# Patient Record
Sex: FEMALE | Race: WHITE | NOT HISPANIC OR LATINO | URBAN - METROPOLITAN AREA
[De-identification: names, ages, dates, MRNs, and addresses within clinical notes are randomized per-mention and may not be internally consistent; named-entity substitution may affect disease eponyms.]

---

## 2023-07-24 ENCOUNTER — OUTPATIENT (OUTPATIENT)
Dept: OUTPATIENT SERVICES | Facility: HOSPITAL | Age: 43
LOS: 1 days | End: 2023-07-24
Payer: MEDICARE

## 2023-07-24 VITALS
HEIGHT: 64.76 IN | DIASTOLIC BLOOD PRESSURE: 62 MMHG | RESPIRATION RATE: 18 BRPM | TEMPERATURE: 97 F | OXYGEN SATURATION: 97 % | HEART RATE: 72 BPM | SYSTOLIC BLOOD PRESSURE: 138 MMHG | WEIGHT: 207.45 LBS

## 2023-07-24 DIAGNOSIS — R62.50 UNSPECIFIED LACK OF EXPECTED NORMAL PHYSIOLOGICAL DEVELOPMENT IN CHILDHOOD: ICD-10-CM

## 2023-07-24 DIAGNOSIS — K02.62 DENTAL CARIES ON SMOOTH SURFACE PENETRATING INTO DENTIN: ICD-10-CM

## 2023-07-24 DIAGNOSIS — Z92.89 PERSONAL HISTORY OF OTHER MEDICAL TREATMENT: Chronic | ICD-10-CM

## 2023-07-24 DIAGNOSIS — K05.6 PERIODONTAL DISEASE, UNSPECIFIED: ICD-10-CM

## 2023-07-24 DIAGNOSIS — Z91.89 OTHER SPECIFIED PERSONAL RISK FACTORS, NOT ELSEWHERE CLASSIFIED: ICD-10-CM

## 2023-07-24 DIAGNOSIS — Z01.818 ENCOUNTER FOR OTHER PREPROCEDURAL EXAMINATION: ICD-10-CM

## 2023-07-24 DIAGNOSIS — Z98.890 OTHER SPECIFIED POSTPROCEDURAL STATES: Chronic | ICD-10-CM

## 2023-07-24 LAB
ANION GAP SERPL CALC-SCNC: 14 MMOL/L — SIGNIFICANT CHANGE UP (ref 5–17)
BUN SERPL-MCNC: 17 MG/DL — SIGNIFICANT CHANGE UP (ref 7–23)
CALCIUM SERPL-MCNC: 9.6 MG/DL — SIGNIFICANT CHANGE UP (ref 8.4–10.5)
CHLORIDE SERPL-SCNC: 105 MMOL/L — SIGNIFICANT CHANGE UP (ref 96–108)
CO2 SERPL-SCNC: 24 MMOL/L — SIGNIFICANT CHANGE UP (ref 22–31)
CREAT SERPL-MCNC: 0.72 MG/DL — SIGNIFICANT CHANGE UP (ref 0.5–1.3)
EGFR: 106 ML/MIN/1.73M2 — SIGNIFICANT CHANGE UP
GLUCOSE SERPL-MCNC: 96 MG/DL — SIGNIFICANT CHANGE UP (ref 70–99)
HCT VFR BLD CALC: 41.2 % — SIGNIFICANT CHANGE UP (ref 34.5–45)
HGB BLD-MCNC: 13.8 G/DL — SIGNIFICANT CHANGE UP (ref 11.5–15.5)
MCHC RBC-ENTMCNC: 29.8 PG — SIGNIFICANT CHANGE UP (ref 27–34)
MCHC RBC-ENTMCNC: 33.5 GM/DL — SIGNIFICANT CHANGE UP (ref 32–36)
MCV RBC AUTO: 89 FL — SIGNIFICANT CHANGE UP (ref 80–100)
NRBC # BLD: 0 /100 WBCS — SIGNIFICANT CHANGE UP (ref 0–0)
PLATELET # BLD AUTO: 249 K/UL — SIGNIFICANT CHANGE UP (ref 150–400)
POTASSIUM SERPL-MCNC: 3.8 MMOL/L — SIGNIFICANT CHANGE UP (ref 3.5–5.3)
POTASSIUM SERPL-SCNC: 3.8 MMOL/L — SIGNIFICANT CHANGE UP (ref 3.5–5.3)
RBC # BLD: 4.63 M/UL — SIGNIFICANT CHANGE UP (ref 3.8–5.2)
RBC # FLD: 12.6 % — SIGNIFICANT CHANGE UP (ref 10.3–14.5)
SODIUM SERPL-SCNC: 143 MMOL/L — SIGNIFICANT CHANGE UP (ref 135–145)
WBC # BLD: 4.93 K/UL — SIGNIFICANT CHANGE UP (ref 3.8–10.5)
WBC # FLD AUTO: 4.93 K/UL — SIGNIFICANT CHANGE UP (ref 3.8–10.5)

## 2023-07-24 PROCEDURE — 36415 COLL VENOUS BLD VENIPUNCTURE: CPT

## 2023-07-24 PROCEDURE — 80048 BASIC METABOLIC PNL TOTAL CA: CPT

## 2023-07-24 PROCEDURE — G0463: CPT

## 2023-07-24 PROCEDURE — 85027 COMPLETE CBC AUTOMATED: CPT

## 2023-07-24 NOTE — H&P PST ADULT - NSICDXPASTMEDICALHX_GEN_ALL_CORE_FT
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

## 2023-07-24 NOTE — H&P PST ADULT - HISTORY OF PRESENT ILLNESS
42 year old female with a history of developmental delay who presented with her father for evaluation. She was seen by her dentist and referred for dental surgery. She presents to Presurgical Testing today prior to Comprehensive dental treatment under anesthesia on 8/3/23.

## 2023-07-24 NOTE — H&P PST ADULT - FUNCTIONAL STATUS
Can walk 3-4 blocks, climbs 1-2 flights of stairs in her home. Denies cardiopulmonary symptoms./4-10 METS

## 2023-07-24 NOTE — H&P PST ADULT - ASSESSMENT
DASI score: 7.34  DASI activity: Independent in ADL's. Does chores at home.   Loose teeth or denture: Denies loose teeth or removable dental work.   MP 3

## 2023-08-02 ENCOUNTER — TRANSCRIPTION ENCOUNTER (OUTPATIENT)
Age: 43
End: 2023-08-02

## 2023-08-03 ENCOUNTER — OUTPATIENT (OUTPATIENT)
Dept: OUTPATIENT SERVICES | Facility: HOSPITAL | Age: 43
LOS: 1 days | End: 2023-08-03
Payer: MEDICARE

## 2023-08-03 ENCOUNTER — TRANSCRIPTION ENCOUNTER (OUTPATIENT)
Age: 43
End: 2023-08-03

## 2023-08-03 VITALS
OXYGEN SATURATION: 97 % | TEMPERATURE: 97 F | DIASTOLIC BLOOD PRESSURE: 62 MMHG | HEART RATE: 72 BPM | SYSTOLIC BLOOD PRESSURE: 138 MMHG | HEIGHT: 64 IN | WEIGHT: 207.45 LBS | RESPIRATION RATE: 18 BRPM

## 2023-08-03 VITALS
OXYGEN SATURATION: 96 % | RESPIRATION RATE: 16 BRPM | SYSTOLIC BLOOD PRESSURE: 150 MMHG | DIASTOLIC BLOOD PRESSURE: 67 MMHG | HEART RATE: 105 BPM

## 2023-08-03 DIAGNOSIS — Z92.89 PERSONAL HISTORY OF OTHER MEDICAL TREATMENT: Chronic | ICD-10-CM

## 2023-08-03 DIAGNOSIS — K02.62 DENTAL CARIES ON SMOOTH SURFACE PENETRATING INTO DENTIN: ICD-10-CM

## 2023-08-03 DIAGNOSIS — Z98.890 OTHER SPECIFIED POSTPROCEDURAL STATES: Chronic | ICD-10-CM

## 2023-08-03 DIAGNOSIS — K05.6 PERIODONTAL DISEASE, UNSPECIFIED: ICD-10-CM

## 2023-08-03 PROCEDURE — C1889: CPT

## 2023-08-03 PROCEDURE — C9399: CPT

## 2023-08-03 PROCEDURE — D2392: CPT

## 2023-08-03 PROCEDURE — D7210: CPT

## 2023-08-03 PROCEDURE — D2394: CPT

## 2023-08-03 PROCEDURE — D4210: CPT

## 2023-08-03 PROCEDURE — D2391: CPT

## 2023-08-03 PROCEDURE — D4341: CPT

## 2023-08-03 DEVICE — SURGICEL 2 X 14": Type: IMPLANTABLE DEVICE | Status: FUNCTIONAL

## 2023-08-03 RX ORDER — ARIPIPRAZOLE 15 MG/1
1 TABLET ORAL
Refills: 0 | DISCHARGE

## 2023-08-03 RX ORDER — OXYCODONE HYDROCHLORIDE 5 MG/1
5 TABLET ORAL ONCE
Refills: 0 | Status: DISCONTINUED | OUTPATIENT
Start: 2023-08-03 | End: 2023-08-03

## 2023-08-03 RX ORDER — ONDANSETRON 8 MG/1
4 TABLET, FILM COATED ORAL ONCE
Refills: 0 | Status: DISCONTINUED | OUTPATIENT
Start: 2023-08-03 | End: 2023-08-18

## 2023-08-03 NOTE — ASU DISCHARGE PLAN (ADULT/PEDIATRIC) - NS MD DC FALL RISK RISK
For information on Fall & Injury Prevention, visit: [email or web address] OR  [email or web address] OR  https://www.cdc.gov/steadi/patient.html

## 2023-08-03 NOTE — ASU PATIENT PROFILE, ADULT - FALL HARM RISK - UNIVERSAL INTERVENTIONS
Bed in lowest position, wheels locked, appropriate side rails in place/Call bell, personal items and telephone in reach/Instruct patient to call for assistance before getting out of bed or chair/Non-slip footwear when patient is out of bed/[place] to call system/Physically safe environment - no spills, clutter or unnecessary equipment/Purposeful Proactive Rounding/Room/bathroom lighting operational, light cord in reach

## 2023-08-03 NOTE — ASU DISCHARGE PLAN (ADULT/PEDIATRIC) - ASU DC SPECIAL INSTRUCTIONSFT
comprehensive dental treatment under general anesthesia    tylenol or motrin for the next few days    exam ,xrays, cleaning, periodontal treatment, fillings and 2  extractions performed to the upper left, one lower left and flouride    no straw for two days and keep head elevated for the next two days and nights     continue antibiotics as prescribed from Dr Lucas    no straw for two days     ice to the left for the next 24-48 hours twenty minutes on and off, cold soft foods for the next 48 hours and no rinsing for 48 hours    see Dr Lucas on 8/8 at 1 pm, appt is set at [facility]

## 2024-04-07 NOTE — H&P PST ADULT - TEMPERATURE IN CELSIUS (DEGREES C)
pt admitted for Observation. Cardiac monitor in place. pt in no distress. IVL intact. Daugter at bedside. will send repeat trop. Safety and comfort measures in place. will cont to monitor. 36.3

## 2024-08-14 NOTE — ASU PREOP CHECKLIST - ANTIBIOTIC
Is This A New Presentation, Or A Follow-Up?: Evaluation for Possible Contact Allergy Additional History: Patient is stating she is getting an allergic reaction to sunscreen. She has used multiple brands this summer including Neutrogena spray, jeanne baby, and Hawaiian tropic. She reports 2 episodes of swelling around her eyes and lips this summer. She denies swelling of her tongue, difficulty breathing, or difficulty swallowing during these episodes. She reports episodes began about 24-36 hours after using sunscreen. Denies itchy rash on skin but reports skin felt tight during and after swelling subsided. She reports multiple bee stings on the day of her first episode. Allergy to cedarwood, molds, and dogs. Has had prick and patch testing in the past. History of receiving allergy shots. no (get order)

## (undated) DEVICE — SOL IRR POUR NS 0.9% 500ML

## (undated) DEVICE — PACK DENTAL

## (undated) DEVICE — SPONGE END-STRUNG CYLINDRICAL .5X1.5"

## (undated) DEVICE — ELCTR BOVIE PENCIL SMOKE EVACUATION

## (undated) DEVICE — ELCTR BOVIE TIP NEEDLE INSULATED 2.8" EDGE

## (undated) DEVICE — SUT CHROMIC 3-0 30" C-13

## (undated) DEVICE — WARMING BLANKET LOWER ADULT

## (undated) DEVICE — POSITIONER FOAM EGG CRATE ULNAR 2PCS (PINK)

## (undated) DEVICE — DRAPE TOWEL BLUE 17" X 24"

## (undated) DEVICE — DRAPE INSTRUMENT POUCH 6.75" X 11"

## (undated) DEVICE — DRAPE MAGNETIC INSTRUMENT MEDIUM

## (undated) DEVICE — GLV 6 PROTEXIS (BLUE)

## (undated) DEVICE — MEDICATION LABELS W MARKER

## (undated) DEVICE — VENODYNE/SCD SLEEVE CALF MEDIUM

## (undated) DEVICE — SOL IRR POUR H2O 250ML